# Patient Record
Sex: MALE | Race: WHITE | NOT HISPANIC OR LATINO | URBAN - METROPOLITAN AREA
[De-identification: names, ages, dates, MRNs, and addresses within clinical notes are randomized per-mention and may not be internally consistent; named-entity substitution may affect disease eponyms.]

---

## 2017-08-24 NOTE — PATIENT DISCUSSION
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 PRESCRIBE WARM COMPRESSES AND EYELID SCRUBS BID, ARTIFICIAL TEARS BID-QID, AND THE DAILY INTAKE OF OMEGA-3 FATTY ACIDS.

## 2017-08-24 NOTE — PATIENT DISCUSSION
Cataracts Glasses Given: It was explained that when the vision no longer meets the patient&rsquo;s needs, and when a new prescription for glasses does not improve visual satisfaction, that the option of cataract surgery is a reasonable next step. It has been determined from manifest refraction and vision testing that a new prescription for glasses may help improve the visual symptoms somewhat but it is not known if they will provide vision that is satisfactory.  A new prescription for glasses was provided to the patient and we will re-evaluate in 6 MONTHS

## 2017-08-24 NOTE — PATIENT DISCUSSION
KIRA, OU- MOST LIKELY DUE TO MGD. TREAT MGD FIRST THEN CONSIDER OTHER TREATMENT OPTIONS IF SYMPTOMS PERSIST.

## 2017-08-24 NOTE — PATIENT DISCUSSION
Dry Eye Syndrome (KIRA) Counseling: Dry Eye Syndrome, also known as Keratoconjunctivitis Sicca, is a common condition that occurs when your tears are not able to provide adequate lubrication for your eyes. Symptoms can be exacerbated by environmental factors such as smoke, wind, or prolonged eye use. Treatment options include, but are not limited to, artificial tears, punctal plugs, Restasis, oral omega-3 supplements, and lubricating ointments. I have explained to the patient that successful management is dependent on patient compliance with treatment as prescribed and/or the treatment regimen.

## 2018-02-13 NOTE — PATIENT DISCUSSION
BLEPHARITIS/MGD, OU- SLIGHTLY WORSENING. ENCOURAGED WARM COMPRESSES AND EYELID SCRUBS BID, ARTIFICIAL TEARS BID-QID, AND THE DAILY INTAKE OF OMEGA-3 FATTY ACIDS.

## 2018-02-13 NOTE — PATIENT DISCUSSION
Cataracts Glasses Given: It was explained that when the vision no longer meets the patient?s needs, and when a new prescription for glasses does not improve visual satisfaction, that the option of cataract surgery is a reasonable next step. It has been determined from manifest refraction and vision testing that a new prescription for glasses may help improve the visual symptoms somewhat but it is not known if they will provide vision that is satisfactory. A new prescription for glasses was provided to the patient and we will re-evaluate in 6 months.

## 2018-08-28 NOTE — PATIENT DISCUSSION
Glasses Option Counseling: I have discussed the option of glasses versus cataract surgery versus following. It was explained that when vision no longer meets the patient?s visual needs and a new prescription for glasses is not likely to satisfy the patient, the option of cataract surgery is a reasonable next step. It has been determined from manifest refraction and vision testing that a new prescription for glasses may help improve the visual symptoms somewhat but is not likely to improve all the patient?s visual symptoms. The patient was offered a new prescription for glasses. The patient has declined the option of glasses. It was explained that there is no guarantee that removing the cataract will improve their visual symptoms, however; it is believed that the cataract is contributing to the patient's visual impairment and surgery may significantly improve both the visual and functional status of the patient. The risks, benefits and alternatives of surgery were discussed with the patient. After this discussion, the patient desires to proceed with cataract surgery with implantation of an intraocular lens to improve vision for driving and to reduce glare.

## 2019-04-04 NOTE — PATIENT DISCUSSION
Surgery  Counseling: I have discussed the option of glasses versus cataract surgery versus following. It was explained that when vision no longer meets the patient's visual needs and a new prescription for glasses is not likely to improve the patient's visual symptoms, the option of cataract surgery is a reasonable next step. It was explained that there is no guarantee that removing the cataract will improve their visual symptoms, however; it is believed that the cataract is contributing to the patient's visual impairment and surgery may significantly improve both the visual and functional status of the patient. The risks, benefits and alternatives of surgery were discussed with the patient. After this discussion, the patient desires to proceed with cataract surgery with implantation of an intraocular lens to improve vision and reduce glare at night.

## 2019-04-04 NOTE — PATIENT DISCUSSION
Macular Drusen Counseling:  Drusen are yellow deposits under the retina. Drusen increases a person's risk of developing AMD. I have explained to the patient that successful management is dependent on patient compliance with treatment as prescribed and/or regular follow-up to monitor for possible progression.

## 2019-04-04 NOTE — PATIENT DISCUSSION
CATARACT, OU - VISUALLY SIGNIFICANT OS &gt; OD. SCHEDULE SX OS THEN LATER IN OD IF VISUAL SYMPTOMS PERSIST.  GLS RX GIVEN TO FILL IF DESIRES IN THE EVENT PT DOES NOT PROCEED W/ SX.

## 2019-04-18 NOTE — PATIENT DISCUSSION
Surgery  Counseling: I have discussed the option of glasses versus cataract surgery versus following . It was explained that when vision no longer meets the patient's visual needs and a new prescription for glasses is not likely to improve all of the patient's visual symptoms, the option of cataract surgery is a reasonable next step. It was explained that there is no guarantee that removing the cataract will improve their visual symptoms, however; it is believed that the cataract is contributing to the patient's visual impairment and surgery may significantly improve both the visual and functional status of the patient. The risks, benefits and alternatives of surgery were discussed with the patient. After this discussion, the patient desires to proceed with cataract surgery with implantation of an intraocular lens to improve vision and reduce glare during the night.

## 2020-02-20 NOTE — PATIENT DISCUSSION
EYELID EDEMA S/P CONTUSION EVELYN - NO ECCHYMOSIS. MODERATE BOGGY EDEMA PRESENT. EDU ON FINDINGS AND GIVEN REASSURANCE. RX COOL COMPRESSES (THERAPEARL MASK VS. ICE PACK). OKAY TO USE IBUPROFEN VS. TYLENOL PRN FOR PAIN. UNDERSTANDS IT MAKE TAKE A COUPLE DAYS FOR SWELLING TO RESOLVE. RTC PRN.

## 2020-02-25 NOTE — PATIENT DISCUSSION
Patient presents w/ a small contusion on the lateral left upper eyelid. Suspect minor infection involving the area. Prescribed Keflex 250mg tabs (one tab PO qid for one week) and Erythromycin ointment (apply twice daily for one week. Recommend warm compresses twice daily. Follow up in 1-2 weeks for continued observation.

## 2020-07-02 ENCOUNTER — IMPORTED ENCOUNTER (OUTPATIENT)
Dept: URBAN - METROPOLITAN AREA CLINIC 50 | Facility: CLINIC | Age: 55
End: 2020-07-02

## 2021-03-15 NOTE — PATIENT DISCUSSION
DRY ARMD OU: STABLE. CONTINUE AREDS2 EQUIVALENT MULTIVITAMIN, UV PROTECTION, SMOKING CESSATION AND DIET RICH IN LEAFY GREENS. MONITOR.

## 2021-03-15 NOTE — PATIENT DISCUSSION
DRY EYE SYNDROME OU: EDUCATED PATIENT ON FINDINGS. CONTINUE ARTIFICIAL TEARS BID/PRN OU. ADVISED TO RTC IF SI/SX PERSIST OR WORSEN - WILL CONSIDER RESTASIS OR PUNCTAL PLUGS. MONITOR.

## 2021-03-15 NOTE — PATIENT DISCUSSION
BLEPHARITIS/MGD OU: EDUCATED PATIENT ON FINDINGS. CONTINUE WARM COMPRESSES QD-BID AND ARTIFICIAL TEARS BID-QID. RECOMMEND THE DAILY INTAKE OF OMEGA-3 FATTY ACIDS WITH PRIMARY CARE PHYSICIANS APPROVAL. ADVISED TO RTC IF SI/SX PERSIST OR WORSEN - WILL CONSIDER LIPIFLOW. MONITOR.

## 2021-04-17 ASSESSMENT — TONOMETRY
OS_IOP_MMHG: 14
OD_IOP_MMHG: 15

## 2021-04-17 ASSESSMENT — VISUAL ACUITY
OD_CC: 20/20
OS_CC: J1+
OS_CC: 20/15-1
OD_CC: J1+

## 2021-06-22 ENCOUNTER — PREPPED CHART (OUTPATIENT)
Dept: URBAN - METROPOLITAN AREA CLINIC 52 | Facility: CLINIC | Age: 56
End: 2021-06-22

## 2021-06-28 ENCOUNTER — ROUTINE EXAM (OUTPATIENT)
Dept: URBAN - METROPOLITAN AREA CLINIC 52 | Facility: CLINIC | Age: 56
End: 2021-06-28

## 2021-06-28 DIAGNOSIS — H35.373: ICD-10-CM

## 2021-06-28 DIAGNOSIS — H25.13: ICD-10-CM

## 2021-06-28 DIAGNOSIS — Z01.01: ICD-10-CM

## 2021-06-28 PROCEDURE — 92134 CPTRZ OPH DX IMG PST SGM RTA: CPT

## 2021-06-28 PROCEDURE — 92014 COMPRE OPH EXAM EST PT 1/>: CPT

## 2021-06-28 ASSESSMENT — VISUAL ACUITY
OS_CC: 20/20
OU_CC: J1+
OD_CC: 20/20

## 2021-06-28 ASSESSMENT — TONOMETRY
OS_IOP_MMHG: 14
OD_IOP_MMHG: 14

## 2022-08-09 ENCOUNTER — ESTABLISHED PATIENT (OUTPATIENT)
Dept: URBAN - METROPOLITAN AREA CLINIC 53 | Facility: CLINIC | Age: 57
End: 2022-08-09

## 2022-08-09 DIAGNOSIS — H25.13: ICD-10-CM

## 2022-08-09 DIAGNOSIS — H35.373: ICD-10-CM

## 2022-08-09 DIAGNOSIS — Z01.01: ICD-10-CM

## 2022-08-09 PROCEDURE — 92015 DETERMINE REFRACTIVE STATE: CPT

## 2022-08-09 PROCEDURE — 92014 COMPRE OPH EXAM EST PT 1/>: CPT

## 2022-08-09 ASSESSMENT — TONOMETRY
OS_IOP_MMHG: 14
OD_IOP_MMHG: 15

## 2022-08-09 ASSESSMENT — VISUAL ACUITY
OU_CC: J1+
OD_CC: 20/25+2
OS_CC: J3
OS_GLARE: 20/30
OS_GLARE: 20/25
OD_CC: J1+
OS_CC: 20/20
OD_GLARE: 20/30
OD_GLARE: 20/30

## 2022-08-09 ASSESSMENT — KERATOMETRY
OD_K1POWER_DIOPTERS: 42.75
OS_AXISANGLE_DEGREES: 4
OD_K2POWER_DIOPTERS: 43.75
OS_AXISANGLE2_DEGREES: 94
OD_AXISANGLE_DEGREES: 155
OS_K1POWER_DIOPTERS: 43.00
OS_K2POWER_DIOPTERS: 44.00
OD_AXISANGLE2_DEGREES: 65

## 2023-08-15 ENCOUNTER — COMPREHENSIVE EXAM (OUTPATIENT)
Dept: URBAN - METROPOLITAN AREA CLINIC 53 | Facility: CLINIC | Age: 58
End: 2023-08-15

## 2023-08-15 DIAGNOSIS — Z01.01: ICD-10-CM

## 2023-08-15 DIAGNOSIS — H35.373: ICD-10-CM

## 2023-08-15 DIAGNOSIS — H25.13: ICD-10-CM

## 2023-08-15 PROCEDURE — 92014 COMPRE OPH EXAM EST PT 1/>: CPT

## 2023-08-15 PROCEDURE — 92015 DETERMINE REFRACTIVE STATE: CPT

## 2023-08-15 ASSESSMENT — KERATOMETRY
OD_K1POWER_DIOPTERS: 42.75
OD_K2POWER_DIOPTERS: 43.75
OS_K2POWER_DIOPTERS: 44.00
OS_AXISANGLE2_DEGREES: 94
OS_AXISANGLE_DEGREES: 4
OD_AXISANGLE2_DEGREES: 65
OS_K1POWER_DIOPTERS: 43.00
OD_AXISANGLE_DEGREES: 155

## 2023-08-15 ASSESSMENT — TONOMETRY
OD_IOP_MMHG: 16
OS_IOP_MMHG: 15

## 2023-08-15 ASSESSMENT — VISUAL ACUITY
OU_CC: J1+@14"
OS_CC: 20/20
OD_GLARE: 20/20
OS_GLARE: 20/20
OD_CC: 20/20
OD_GLARE: 20/20
OS_GLARE: 20/20

## 2024-08-20 ENCOUNTER — COMPREHENSIVE EXAM (OUTPATIENT)
Dept: URBAN - METROPOLITAN AREA CLINIC 53 | Facility: CLINIC | Age: 59
End: 2024-08-20

## 2024-08-20 DIAGNOSIS — H35.373: ICD-10-CM

## 2024-08-20 DIAGNOSIS — H25.13: ICD-10-CM

## 2024-08-20 DIAGNOSIS — H52.4: ICD-10-CM

## 2024-08-20 DIAGNOSIS — Z01.01: ICD-10-CM

## 2024-08-20 PROCEDURE — 92015 DETERMINE REFRACTIVE STATE: CPT

## 2024-08-20 PROCEDURE — 92014 COMPRE OPH EXAM EST PT 1/>: CPT

## 2024-08-20 ASSESSMENT — VISUAL ACUITY
OS_GLARE: 20/20
OU_CC: 20/20
OD_GLARE: 20/20
OS_CC: 20/20-1
OD_CC: 20/20
OU_CC: J1+

## 2024-08-20 ASSESSMENT — TONOMETRY
OD_IOP_MMHG: 13
OS_IOP_MMHG: 14

## 2024-08-20 ASSESSMENT — KERATOMETRY
OS_AXISANGLE_DEGREES: 4
OS_K1POWER_DIOPTERS: 43.00
OD_AXISANGLE_DEGREES: 155
OD_K1POWER_DIOPTERS: 42.75
OS_K2POWER_DIOPTERS: 44.00
OD_K2POWER_DIOPTERS: 43.75
OD_AXISANGLE2_DEGREES: 65
OS_AXISANGLE2_DEGREES: 94